# Patient Record
Sex: FEMALE | Race: WHITE | NOT HISPANIC OR LATINO | Employment: OTHER | URBAN - METROPOLITAN AREA
[De-identification: names, ages, dates, MRNs, and addresses within clinical notes are randomized per-mention and may not be internally consistent; named-entity substitution may affect disease eponyms.]

---

## 2021-12-31 LAB
INFLUENZA A ANTIGEN, POC: NEGATIVE
INFLUENZA B ANTIGEN, POC: NEGATIVE
SARS-COV-2 RNA RESP QL NAA+PROBE: NEGATIVE

## 2022-04-11 ENCOUNTER — HISTORICAL (OUTPATIENT)
Dept: ADMINISTRATIVE | Facility: HOSPITAL | Age: 67
End: 2022-04-11

## 2022-04-28 VITALS
OXYGEN SATURATION: 100 % | DIASTOLIC BLOOD PRESSURE: 77 MMHG | SYSTOLIC BLOOD PRESSURE: 150 MMHG | WEIGHT: 229.25 LBS | BODY MASS INDEX: 39.14 KG/M2 | HEIGHT: 64 IN

## 2022-07-08 ENCOUNTER — OFFICE VISIT (OUTPATIENT)
Dept: URGENT CARE | Facility: CLINIC | Age: 67
End: 2022-07-08
Payer: MEDICARE

## 2022-07-08 VITALS
SYSTOLIC BLOOD PRESSURE: 114 MMHG | DIASTOLIC BLOOD PRESSURE: 70 MMHG | OXYGEN SATURATION: 100 % | RESPIRATION RATE: 17 BRPM | TEMPERATURE: 99 F | HEART RATE: 90 BPM | WEIGHT: 230 LBS | HEIGHT: 64 IN | BODY MASS INDEX: 39.27 KG/M2

## 2022-07-08 DIAGNOSIS — J00 NASOPHARYNGITIS ACUTE: Primary | ICD-10-CM

## 2022-07-08 DIAGNOSIS — R05.9 COUGH: ICD-10-CM

## 2022-07-08 LAB
CTP QC/QA: YES
FLUAV AG NPH QL: NEGATIVE
FLUBV AG NPH QL: NEGATIVE
S PYO RRNA THROAT QL PROBE: NEGATIVE
SARS-COV-2 RDRP RESP QL NAA+PROBE: NEGATIVE

## 2022-07-08 PROCEDURE — 1159F MED LIST DOCD IN RCRD: CPT | Mod: CPTII,,, | Performed by: FAMILY MEDICINE

## 2022-07-08 PROCEDURE — 1159F PR MEDICATION LIST DOCUMENTED IN MEDICAL RECORD: ICD-10-PCS | Mod: CPTII,,, | Performed by: FAMILY MEDICINE

## 2022-07-08 PROCEDURE — 3074F PR MOST RECENT SYSTOLIC BLOOD PRESSURE < 130 MM HG: ICD-10-PCS | Mod: CPTII,,, | Performed by: FAMILY MEDICINE

## 2022-07-08 PROCEDURE — 1160F RVW MEDS BY RX/DR IN RCRD: CPT | Mod: CPTII,,, | Performed by: FAMILY MEDICINE

## 2022-07-08 PROCEDURE — 87880 POCT RAPID STREP A: ICD-10-PCS | Mod: QW,,, | Performed by: FAMILY MEDICINE

## 2022-07-08 PROCEDURE — 3078F DIAST BP <80 MM HG: CPT | Mod: CPTII,,, | Performed by: FAMILY MEDICINE

## 2022-07-08 PROCEDURE — U0002 COVID-19 LAB TEST NON-CDC: HCPCS | Mod: QW,,, | Performed by: FAMILY MEDICINE

## 2022-07-08 PROCEDURE — 3074F SYST BP LT 130 MM HG: CPT | Mod: CPTII,,, | Performed by: FAMILY MEDICINE

## 2022-07-08 PROCEDURE — 99213 OFFICE O/P EST LOW 20 MIN: CPT | Mod: ,,, | Performed by: FAMILY MEDICINE

## 2022-07-08 PROCEDURE — 87804 INFLUENZA ASSAY W/OPTIC: CPT | Mod: QW,,, | Performed by: FAMILY MEDICINE

## 2022-07-08 PROCEDURE — 87804 POCT INFLUENZA A/B: ICD-10-PCS | Mod: 59,QW,, | Performed by: FAMILY MEDICINE

## 2022-07-08 PROCEDURE — 87880 STREP A ASSAY W/OPTIC: CPT | Mod: QW,,, | Performed by: FAMILY MEDICINE

## 2022-07-08 PROCEDURE — 3008F BODY MASS INDEX DOCD: CPT | Mod: CPTII,,, | Performed by: FAMILY MEDICINE

## 2022-07-08 PROCEDURE — 99213 PR OFFICE/OUTPT VISIT, EST, LEVL III, 20-29 MIN: ICD-10-PCS | Mod: ,,, | Performed by: FAMILY MEDICINE

## 2022-07-08 PROCEDURE — 3008F PR BODY MASS INDEX (BMI) DOCUMENTED: ICD-10-PCS | Mod: CPTII,,, | Performed by: FAMILY MEDICINE

## 2022-07-08 PROCEDURE — U0002: ICD-10-PCS | Mod: QW,,, | Performed by: FAMILY MEDICINE

## 2022-07-08 PROCEDURE — 1160F PR REVIEW ALL MEDS BY PRESCRIBER/CLIN PHARMACIST DOCUMENTED: ICD-10-PCS | Mod: CPTII,,, | Performed by: FAMILY MEDICINE

## 2022-07-08 PROCEDURE — 3078F PR MOST RECENT DIASTOLIC BLOOD PRESSURE < 80 MM HG: ICD-10-PCS | Mod: CPTII,,, | Performed by: FAMILY MEDICINE

## 2022-07-08 RX ORDER — CYANOCOBALAMIN (VITAMIN B-12) 500 MCG
TABLET ORAL
COMMUNITY
Start: 2021-08-31

## 2022-07-08 RX ORDER — LEVOTHYROXINE SODIUM 75 UG/1
TABLET ORAL
COMMUNITY
Start: 2022-01-17

## 2022-07-08 RX ORDER — ROSUVASTATIN CALCIUM 10 MG/1
10 TABLET, COATED ORAL NIGHTLY
COMMUNITY
Start: 2022-05-14

## 2022-07-08 RX ORDER — BUPROPION HYDROCHLORIDE 150 MG/1
150 TABLET ORAL EVERY MORNING
COMMUNITY
Start: 2022-04-15

## 2022-07-08 RX ORDER — HYDROCHLOROTHIAZIDE 12.5 MG/1
12.5 CAPSULE ORAL
COMMUNITY

## 2022-07-08 NOTE — PROGRESS NOTES
"Subjective:       Patient ID: Franchesca Bang is a 67 y.o. female.    Vitals:  height is 5' 4" (1.626 m) and weight is 104.3 kg (230 lb). Her temperature is 99 °F (37.2 °C). Her blood pressure is 114/70 and her pulse is 90. Her respiration is 17 and oxygen saturation is 100%.     Chief Complaint: Sinus Problem    Cough, sinus drip, chest congestion x yesterday     [place]:  Patient is seen and evaluated in an limited status for concern for COVID-19. In order to decrease the risk of exposure to the hospital and health care workers, only a limited exam was done.   67-year-old female present to clinic with concerns of nasal congestion, sinus congestion, postnasal drip and chest congestion started yesterday.  Temp in the clinic 99. No body aches, no headache or chills.  No short of breath or wheezing.  Vaccinated for COVID-19.  No concerns of positive exposure to infections    Provider Precautions  N95 mask  Gloves  Eye protection- Face Shield    Covid Screening  No confirmation close contact  No travel to high risk area  No recent testing done    Review of Systems  Constitutional _No fever, body aches, headache  ENMT_sore throat, nasal congestion and postnasal drip  Respiratory_coughing, no shortness of breath or wheezing  Cardiovascular_no chest pain, no palpitations  Gastrointestinal_negative for nausea or vomiting  Integumentary_negative for rash     Objective:      Physical Exam    General : Alert and Oriented, No apparent distress, afebrile  Neck - supple  HENT : Oropharynx no redness or swelling.TMs intact mild fluid no redness.   Respiratory : Bilateral equal breath sounds, nonlabored respirations  Cardiovascular : Rate, rhythm regular, normal volume pulse, no murmur  Integumentary : Warm, Dry and no rash    Assessment:       1. Nasopharyngitis acute    2. Cough          Plan:     Discussed the physical finding , Condition and course  Tylenol and ibuprofen for fever, body aches and sore throat.   Warm saltwater " gargles for sore throat.   Claritin 10 mg or Zyrtec 10 mg for nasal congestion  Coricidin HBP for cough and cold as needed and as directed   call or return to clinic will consider antibiotics    Nasopharyngitis acute    Cough  -     POCT Influenza A/B  -     POCT COVID-19 Rapid Screening  -     POCT rapid strep A    Flu test negative, COVID-19 test negative, strep test negative today  Possible early testing and examination.  Monitor the symptoms closely  For fever body aches and headache can return to clinic for COVID-19 testing is nurse's visit

## 2022-07-10 ENCOUNTER — TELEPHONE (OUTPATIENT)
Dept: URGENT CARE | Facility: CLINIC | Age: 67
End: 2022-07-10
Payer: MEDICARE

## 2022-07-10 DIAGNOSIS — J00 NASOPHARYNGITIS ACUTE: Primary | ICD-10-CM

## 2022-07-10 RX ORDER — AZITHROMYCIN 250 MG/1
TABLET, FILM COATED ORAL
Qty: 6 TABLET | Refills: 0 | Status: SHIPPED | OUTPATIENT
Start: 2022-07-10

## 2022-07-10 NOTE — TELEPHONE ENCOUNTER
Called pt and let her know script was sent to pharmacy pt verbalized understanding     ----- Message from Shaina Macdonald MD sent at 7/10/2022 10:43 AM CDT -----  Notify patient with prescription sent to the pharmacy [facility].  Continue over-the-counter medication for cough and congestion.  ----- Message -----  From: RT Betsey  Sent: 7/10/2022   8:50 AM CDT  To: [facility] Results      ----- Message -----  From: Erick Escamilla MA  Sent: 7/10/2022   8:41 AM CDT  To: [facility] Clinical Support Staff    Patient was seen on Friday tested neg for strep, covid and flu patient stated that she was told to call back if she did not start to feel better, she is still having symptoms of coughing and overall not feeling better. She was asking if she could get an antibiotic. Please advise

## 2022-07-10 NOTE — TELEPHONE ENCOUNTER
----- Message from RT Betsey sent at 7/10/2022  8:50 AM CDT -----    ----- Message -----  From: Erick Escamilla MA  Sent: 7/10/2022   8:41 AM CDT  To: [facility] Clinical Support Staff    Patient was seen on Friday tested neg for strep, covid and flu patient stated that she was told to call back if she did not start to feel better, she is still having symptoms of coughing and overall not feeling better. She was asking if she could get an antibiotic. Please advise

## 2022-07-14 ENCOUNTER — OFFICE VISIT (OUTPATIENT)
Dept: URGENT CARE | Facility: CLINIC | Age: 67
End: 2022-07-14
Payer: MEDICARE

## 2022-07-14 VITALS
HEART RATE: 86 BPM | TEMPERATURE: 99 F | HEIGHT: 64 IN | RESPIRATION RATE: 16 BRPM | SYSTOLIC BLOOD PRESSURE: 158 MMHG | DIASTOLIC BLOOD PRESSURE: 85 MMHG | BODY MASS INDEX: 39.27 KG/M2 | WEIGHT: 230 LBS | OXYGEN SATURATION: 100 %

## 2022-07-14 DIAGNOSIS — J00 NASOPHARYNGITIS ACUTE: Primary | ICD-10-CM

## 2022-07-14 PROCEDURE — 99212 PR OFFICE/OUTPT VISIT, EST, LEVL II, 10-19 MIN: ICD-10-PCS | Mod: 25,,, | Performed by: FAMILY MEDICINE

## 2022-07-14 PROCEDURE — 3008F PR BODY MASS INDEX (BMI) DOCUMENTED: ICD-10-PCS | Mod: CPTII,,, | Performed by: FAMILY MEDICINE

## 2022-07-14 PROCEDURE — 1159F MED LIST DOCD IN RCRD: CPT | Mod: CPTII,,, | Performed by: FAMILY MEDICINE

## 2022-07-14 PROCEDURE — 1160F RVW MEDS BY RX/DR IN RCRD: CPT | Mod: CPTII,,, | Performed by: FAMILY MEDICINE

## 2022-07-14 PROCEDURE — 1159F PR MEDICATION LIST DOCUMENTED IN MEDICAL RECORD: ICD-10-PCS | Mod: CPTII,,, | Performed by: FAMILY MEDICINE

## 2022-07-14 PROCEDURE — 1160F PR REVIEW ALL MEDS BY PRESCRIBER/CLIN PHARMACIST DOCUMENTED: ICD-10-PCS | Mod: CPTII,,, | Performed by: FAMILY MEDICINE

## 2022-07-14 PROCEDURE — 3077F PR MOST RECENT SYSTOLIC BLOOD PRESSURE >= 140 MM HG: ICD-10-PCS | Mod: CPTII,,, | Performed by: FAMILY MEDICINE

## 2022-07-14 PROCEDURE — 3079F DIAST BP 80-89 MM HG: CPT | Mod: CPTII,,, | Performed by: FAMILY MEDICINE

## 2022-07-14 PROCEDURE — 3077F SYST BP >= 140 MM HG: CPT | Mod: CPTII,,, | Performed by: FAMILY MEDICINE

## 2022-07-14 PROCEDURE — 96372 PR INJECTION,THERAP/PROPH/DIAG2ST, IM OR SUBCUT: ICD-10-PCS | Mod: ,,, | Performed by: FAMILY MEDICINE

## 2022-07-14 PROCEDURE — 99212 OFFICE O/P EST SF 10 MIN: CPT | Mod: 25,,, | Performed by: FAMILY MEDICINE

## 2022-07-14 PROCEDURE — 3079F PR MOST RECENT DIASTOLIC BLOOD PRESSURE 80-89 MM HG: ICD-10-PCS | Mod: CPTII,,, | Performed by: FAMILY MEDICINE

## 2022-07-14 PROCEDURE — 96372 THER/PROPH/DIAG INJ SC/IM: CPT | Mod: ,,, | Performed by: FAMILY MEDICINE

## 2022-07-14 PROCEDURE — 3008F BODY MASS INDEX DOCD: CPT | Mod: CPTII,,, | Performed by: FAMILY MEDICINE

## 2022-07-14 RX ORDER — BETAMETHASONE SODIUM PHOSPHATE AND BETAMETHASONE ACETATE 3; 3 MG/ML; MG/ML
9 INJECTION, SUSPENSION INTRA-ARTICULAR; INTRALESIONAL; INTRAMUSCULAR; SOFT TISSUE ONCE
Status: COMPLETED | OUTPATIENT
Start: 2022-07-14 | End: 2022-07-14

## 2022-07-14 RX ADMIN — BETAMETHASONE SODIUM PHOSPHATE AND BETAMETHASONE ACETATE 9 MG: 3; 3 INJECTION, SUSPENSION INTRA-ARTICULAR; INTRALESIONAL; INTRAMUSCULAR; SOFT TISSUE at 01:07

## 2022-07-14 NOTE — PATIENT INSTRUCTIONS
cool mist vaporizer and cough drop to keep there was moist.  Course sitting HBP for cough and cold.    [facility] IM today risk and benefits discussed voiced understanding.  Discussed in detail on [facility]-Ceas[facility], course of action.  Call or return to clinic for any questions

## 2022-07-14 NOTE — PROGRESS NOTES
"Subjective:       Patient ID: Franchesca Bang is a 67 y.o. female.    Vitals:  height is 5' 4" (1.626 m) and weight is 104.3 kg (230 lb). Her temperature is 98.6 °F (37 °C). Her blood pressure is 158/85 (abnormal) and her pulse is 86. Her respiration is 16 and oxygen saturation is 100%.     Chief Complaint: Sinus Problem    Pt seen 7/8- tested negative for flu, strep and covid- was prescribed z ceasar on 7/10- finished it this morning but still not better - cough, fatigue, sore throat, sinus congestion, chest congestion     [place]:  67-year-old returns to clinic with concerns of congestion and sore throat.  No coughing.  No short of breath or wheezing.  No new fever.  Seen in the clinic on July 10th tested negative for flu strep and COVID.  Completed taking Z-Ceasar as prescribed.    ROS  :  Constitutional_ no fever, no body aches or headache   HENT_Sore throat, mainly on left side, postnasal drainage  Respiratory_no wheezing, no shortness of breath  Cardiovascular_no chest pain  Gastrointestinal_ denies nausea vomiting or abdominal pain   Musculoskeletal_no joint pain, no joint swelling  Integumentary_no skin rash    Objective:      Physical Exam    General : Alert and Oriented, No apparent distress, afebrile, appears comfortable sitting in exam chair, clear speech appropriate communication.  Reviewed the vital signs, O2 sats 100%  Neck - supple  HENT : Oropharynx no redness or swelling. Tonsils not enlarged, no exudate. TMs intact mild fluid no redness.   Respiratory : Bilateral equal breath sounds, nonlabored respirations  Cardiovascular : Rate, rhythm regular, normal volume pulse, no murmur  Integumentary : Warm, Dry and no rash    Assessment:       1. Nasopharyngitis acute          Plan:     cool mist vaporizer and cough drop to keep there was moist.  Course sitting HBP for cough and cold.    [facility] IM today risk and benefits discussed voiced understanding.  Discussed in detail on Z-Ceasar, course of action.  Call " or return to clinic for any questions  Nasopharyngitis acute  -     betamethasone acetate-betamethasone sodium phosphate injection 9 mg

## 2022-09-22 ENCOUNTER — HISTORICAL (OUTPATIENT)
Dept: ADMINISTRATIVE | Facility: HOSPITAL | Age: 67
End: 2022-09-22
Payer: MEDICARE

## 2025-08-06 PROCEDURE — 88305 TISSUE EXAM BY PATHOLOGIST: CPT | Performed by: PATHOLOGY

## 2025-08-06 PROCEDURE — 88305 TISSUE EXAM BY PATHOLOGIST: CPT

## 2025-08-07 ENCOUNTER — LAB REQUISITION (OUTPATIENT)
Dept: LAB | Facility: HOSPITAL | Age: 70
End: 2025-08-07
Payer: MEDICARE

## 2025-08-07 DIAGNOSIS — R68.81 EARLY SATIETY: ICD-10-CM

## 2025-08-13 LAB
LABORATORY COMMENT REPORT: NORMAL
PATH REPORT.FINAL DX SPEC: NORMAL
PATH REPORT.GROSS SPEC: NORMAL
PATH REPORT.RELEVANT HX SPEC: NORMAL
PATH REPORT.TOTAL CANCER: NORMAL